# Patient Record
Sex: MALE
[De-identification: names, ages, dates, MRNs, and addresses within clinical notes are randomized per-mention and may not be internally consistent; named-entity substitution may affect disease eponyms.]

---

## 2022-06-17 PROBLEM — Z00.00 ENCOUNTER FOR PREVENTIVE HEALTH EXAMINATION: Status: ACTIVE | Noted: 2022-06-17

## 2022-06-20 ENCOUNTER — APPOINTMENT (OUTPATIENT)
Dept: PEDIATRIC ORTHOPEDIC SURGERY | Facility: CLINIC | Age: 27
End: 2022-06-20
Payer: COMMERCIAL

## 2022-06-20 VITALS — WEIGHT: 170 LBS | HEIGHT: 67 IN | BODY MASS INDEX: 26.68 KG/M2

## 2022-06-20 DIAGNOSIS — S33.5XXA SPRAIN OF LIGAMENTS OF LUMBAR SPINE, INITIAL ENCOUNTER: ICD-10-CM

## 2022-06-20 PROCEDURE — 99212 OFFICE O/P EST SF 10 MIN: CPT

## 2022-06-20 RX ORDER — DEXTROAMPHETAMINE SULFATE, DEXTROAMPHETAMINE SACCHARATE, AMPHETAMINE SULFATE AND AMPHETAMINE ASPARTATE 6.25; 6.25; 6.25; 6.25 MG/1; MG/1; MG/1; MG/1
25 CAPSULE, EXTENDED RELEASE ORAL
Refills: 0 | Status: ACTIVE | COMMUNITY

## 2022-06-21 PROBLEM — S33.5XXA SPRAIN OF LUMBAR REGION, INITIAL ENCOUNTER: Status: ACTIVE | Noted: 2022-06-21

## 2022-07-08 ENCOUNTER — APPOINTMENT (OUTPATIENT)
Dept: PEDIATRIC ORTHOPEDIC SURGERY | Facility: CLINIC | Age: 27
End: 2022-07-08

## 2022-07-08 VITALS — BODY MASS INDEX: 26.68 KG/M2 | WEIGHT: 170 LBS | HEIGHT: 67 IN

## 2022-07-08 PROCEDURE — 99213 OFFICE O/P EST LOW 20 MIN: CPT

## 2022-07-08 RX ORDER — PREDNISONE 10 MG/1
10 TABLET ORAL
Qty: 60 | Refills: 0 | Status: ACTIVE | COMMUNITY
Start: 2022-07-08 | End: 1900-01-01

## 2022-07-08 RX ORDER — CYCLOBENZAPRINE HYDROCHLORIDE 5 MG/1
5 TABLET, FILM COATED ORAL TWICE DAILY
Qty: 30 | Refills: 1 | Status: ACTIVE | COMMUNITY
Start: 2022-07-08 | End: 1900-01-01

## 2022-07-08 NOTE — PHYSICAL EXAM
[de-identified] : His exam today reveals very poor motion to the lumbar spine in all planes spasm and tenderness is noted on both sides with no obvious trigger points present.  Both lower extremities move well at all individual joints however straight leg raising is very uncomfortable on both sides.  Neurologically there is no focal motor deficit present.  He does have marked tension however

## 2022-07-08 NOTE — ASSESSMENT
[FreeTextEntry1] : Impression: Lumbar radiculitis rule out herniated disc.\par \par He will discontinue the Naprosyn continue with Flexeril and physical therapy.  I have placed him on a 2-week sliding course of prednisone with GI precautions.  MRI of the lumbar spine has been ordered as well.

## 2022-07-08 NOTE — HISTORY OF PRESENT ILLNESS
[de-identified] : This 26-year-old returns with severe back pain.  He states that his pain has increased since he was last seen.  He has taken the medication and has had 4 sessions of physical therapy.  His pain radiates into both buttocks and occasionally the lower extremities.  He has numbness and tingling.  He has difficulty standing erect and is using a cane on today's visit

## 2022-07-08 NOTE — ASSESSMENT
[FreeTextEntry1] : Impression: Severe lumbar radiculitis likely herniated disc.\par \par This patient will discontinue the naproxen.  I have placed him on a 2-week\par Course of prednisone with GI precautions.  Continue with Flexeril.  And physical therapy.  MRI of the lumbar spine has been ordered

## 2022-07-08 NOTE — PHYSICAL EXAM
[de-identified] : His exam reveals painful gait.  He has great difficulty standing erect.  He has poor motion to the lumbar spine in all planes secondary to diffuse paravertebral muscle spasm and tenderness present.  No obvious trigger points.  Straight leg raising is uncomfortable on both sides though no gross focal motor weakness is noted.  He is very uncomfortable.

## 2022-07-08 NOTE — PHYSICAL EXAM
[de-identified] : His exam reveals painful gait.  He has great difficulty standing erect.  He has poor motion to the lumbar spine in all planes secondary to diffuse paravertebral muscle spasm and tenderness present.  No obvious trigger points.  Straight leg raising is uncomfortable on both sides though no gross focal motor weakness is noted.  He is very uncomfortable.

## 2022-07-08 NOTE — HISTORY OF PRESENT ILLNESS
[de-identified] : This 26-year-old returns for follow-up of back pain.  Since he was last seen he has gone to physical therapy and has taken the naproxen as well as Flexeril.  He however has had marked increase of his pain and it is disabling at this time.  He cannot stand and walk erect.  He comes in today using a cane.  He has pain that radiates into both buttocks and slightly distal on both sides with some mild numbness and tingling.  No bladder or bowel dysfunction.  He states that he has had a long history of back pain but never anything like this past month.  And it is only getting worse

## 2022-07-11 RX ORDER — PREDNISONE 10 MG/1
10 TABLET ORAL
Qty: 60 | Refills: 0 | Status: ACTIVE | COMMUNITY
Start: 2022-07-11 | End: 1900-01-01

## 2022-07-25 ENCOUNTER — APPOINTMENT (OUTPATIENT)
Dept: PEDIATRIC ORTHOPEDIC SURGERY | Facility: CLINIC | Age: 27
End: 2022-07-25

## 2022-07-25 VITALS — BODY MASS INDEX: 26.68 KG/M2 | WEIGHT: 170 LBS | HEIGHT: 67 IN

## 2022-07-25 DIAGNOSIS — M51.27 OTHER INTERVERTEBRAL DISC DISPLACEMENT, LUMBOSACRAL REGION: ICD-10-CM

## 2022-07-25 DIAGNOSIS — M54.16 RADICULOPATHY, LUMBAR REGION: ICD-10-CM

## 2022-07-25 PROCEDURE — 99213 OFFICE O/P EST LOW 20 MIN: CPT

## 2022-07-26 PROBLEM — M51.27 HERNIATED NUCLEUS PULPOSUS, L5-S1: Status: ACTIVE | Noted: 2022-07-26

## 2022-07-27 PROBLEM — M54.16 LUMBAR RADICULITIS: Status: ACTIVE | Noted: 2022-07-08

## 2022-07-27 NOTE — ASSESSMENT
[FreeTextEntry1] : Herniation L5-S1\par \par Because this patient is improving I advised further continued conservative treatment and observation.  He has been made aware of the possibility of epidural injection as well as surgical intervention.  There was a 12-minute discussion concerning the possibility of surgical intervention if his symptoms recur.\par \par Encounter time: 27 minutes

## 2022-07-27 NOTE — PHYSICAL EXAM
[FreeTextEntry1] : On physical examination the patient still has pain on attempts at full extension of the lumbar spine.  Left lateral bending does not cause pain.  Straight leg raising is negative bilaterally.  Motor sensory and deep tendon reflex examination of both lower extremities is within normal limits.

## 2022-07-27 NOTE — HISTORY OF PRESENT ILLNESS
[FreeTextEntry1] : This patient returns for reevaluation of back and left leg pain.  The patient did have a recent MRI which revealed a herniated disc at L5-S1.  He states though that he is improving as time is going on.He no longer has leg pain.

## 2025-01-20 ENCOUNTER — OFFICE (OUTPATIENT)
Dept: URBAN - METROPOLITAN AREA CLINIC 29 | Facility: CLINIC | Age: 30
Setting detail: OPHTHALMOLOGY
End: 2025-01-20
Payer: COMMERCIAL

## 2025-01-20 DIAGNOSIS — H16.223: ICD-10-CM

## 2025-01-20 DIAGNOSIS — H01.004: ICD-10-CM

## 2025-01-20 DIAGNOSIS — H01.001: ICD-10-CM

## 2025-01-20 DIAGNOSIS — H02.89: ICD-10-CM

## 2025-01-20 DIAGNOSIS — H40.011: ICD-10-CM

## 2025-01-20 PROCEDURE — 92004 COMPRE OPH EXAM NEW PT 1/>: CPT | Performed by: OPHTHALMOLOGY

## 2025-01-20 PROCEDURE — 92133 CPTRZD OPH DX IMG PST SGM ON: CPT | Performed by: OPHTHALMOLOGY

## 2025-01-20 PROCEDURE — 76514 ECHO EXAM OF EYE THICKNESS: CPT | Performed by: OPHTHALMOLOGY

## 2025-01-20 PROCEDURE — 92083 EXTENDED VISUAL FIELD XM: CPT | Performed by: OPHTHALMOLOGY

## 2025-01-20 ASSESSMENT — REFRACTION_AUTOREFRACTION
OS_CYLINDER: +0.25
OS_SPHERE: -2.75
OD_CYLINDER: +0.75
OS_AXIS: 059
OD_SPHERE: -3.00
OD_AXIS: 076

## 2025-01-20 ASSESSMENT — CONFRONTATIONAL VISUAL FIELD TEST (CVF)
OD_FINDINGS: FULL
OS_FINDINGS: FULL

## 2025-01-20 ASSESSMENT — REFRACTION_CURRENTRX
OS_CYLINDER: +0.25
OD_CYLINDER: +0.50
OS_OVR_VA: 20/
OD_OVR_VA: 20/
OS_AXIS: 149
OD_AXIS: 48
OD_AXIS: 155
OS_OVR_VA: 20/
OD_OVR_VA: 20/
OS_SPHERE: -2.00
OD_SPHERE: -3.25
OS_SPHERE: -2.50
OS_CYLINDER: SPH
OD_SPHERE: -2.25
OD_CYLINDER: +0.50

## 2025-01-20 ASSESSMENT — REFRACTION_MANIFEST
OS_SPHERE: -2.50
OD_CYLINDER: -0.50
OD_SPHERE: -3.00
OD_CYLINDER: -0.50
OD_VA1: 20/25
OD_AXIS: 065
OS_SPHERE: -2.50
OS_VA1: 20/25
OD_VA1: 20/25
OS_VA1: 20/20
OD_SPHERE: -3.00
OD_AXIS: 65

## 2025-01-20 ASSESSMENT — TONOMETRY
OD_IOP_MMHG: 18
OS_IOP_MMHG: 18

## 2025-01-20 ASSESSMENT — PACHYMETRY
OS_CT_CORRECTION: -3
OS_CT_UM: 584
OD_CT_UM: 584
OD_CT_CORRECTION: -3

## 2025-01-20 ASSESSMENT — VISUAL ACUITY
OD_BCVA: 20/20
OS_BCVA: 20/30-2

## 2025-01-20 ASSESSMENT — LID EXAM ASSESSMENTS
OD_MEIBOMITIS: RUL 1+
OS_BLEPHARITIS: LUL T
OD_BLEPHARITIS: RUL T

## 2025-01-20 ASSESSMENT — SUPERFICIAL PUNCTATE KERATITIS (SPK)
OS_SPK: 1+
OD_SPK: 1+